# Patient Record
Sex: FEMALE | Race: WHITE | Employment: FULL TIME | URBAN - METROPOLITAN AREA
[De-identification: names, ages, dates, MRNs, and addresses within clinical notes are randomized per-mention and may not be internally consistent; named-entity substitution may affect disease eponyms.]

---

## 2017-08-01 ENCOUNTER — TRANSFERRED RECORDS (OUTPATIENT)
Dept: HEALTH INFORMATION MANAGEMENT | Facility: CLINIC | Age: 22
End: 2017-08-01

## 2017-08-30 ENCOUNTER — TRANSFERRED RECORDS (OUTPATIENT)
Dept: HEALTH INFORMATION MANAGEMENT | Facility: CLINIC | Age: 22
End: 2017-08-30

## 2018-09-06 ENCOUNTER — TRANSFERRED RECORDS (OUTPATIENT)
Dept: HEALTH INFORMATION MANAGEMENT | Facility: CLINIC | Age: 23
End: 2018-09-06

## 2018-10-08 NOTE — TELEPHONE ENCOUNTER
FUTURE VISIT INFORMATION      FUTURE VISIT INFORMATION:    Date: 10/17/18    Time: 1245    Location: ORTHO  REFERRAL INFORMATION:    Referring provider:  DR GONZALES    Referring providers clinic:  [facility]    Reason for visit/diagnosis  L FOOT OSTEOMA     RECORDS REQUESTED FROM:       Clinic name Comments Records Status Imaging Status   [facility] REQUESTED RECORDS AND IMAGES 10/11/18@1135                                     RECORDS STATUS      RECORDS RECEIVED FROM: [facility]   DATE RECEIVED: 10/11/18   NOTES STATUS DETAILS   OFFICE NOTE from referring provider Received 8/27/18*8/30/17*7/17/17*   OFFICE NOTE from other specialist N/A    DISCHARGE SUMMARY from hospital N/A    DISCHARGE REPORT from the ER N/A    OPERATIVE REPORT N/A    MEDICATION LIST N/A    IMPLANT RECORD/STICKER N/A    LABS     CBC/DIFF N/A    CULTURES N/A    INJECTIONS DONE IN RADIOLOGY N/A    MRI RECEIVED 8/1/17*   CT SCAN RECEIVED 9/6/18*   XRAYS (IMAGES & REPORTS) N/A    TUMOR     PATHOLOGY  Slides & report N/A

## 2018-10-17 ENCOUNTER — PRE VISIT (OUTPATIENT)
Dept: ORTHOPEDICS | Facility: CLINIC | Age: 23
End: 2018-10-17

## 2018-10-17 ENCOUNTER — OFFICE VISIT (OUTPATIENT)
Dept: ORTHOPEDICS | Facility: CLINIC | Age: 23
End: 2018-10-17
Payer: COMMERCIAL

## 2018-10-17 VITALS — HEIGHT: 65 IN | BODY MASS INDEX: 25.99 KG/M2 | WEIGHT: 156 LBS

## 2018-10-17 DIAGNOSIS — M89.9 BONE LESION: Primary | ICD-10-CM

## 2018-10-17 RX ORDER — HYDROXYZINE HYDROCHLORIDE 25 MG/1
TABLET, FILM COATED ORAL
Refills: 1 | COMMUNITY
Start: 2018-09-18

## 2018-10-17 RX ORDER — IBUPROFEN 800 MG/1
TABLET, FILM COATED ORAL
Refills: 0 | COMMUNITY
Start: 2018-08-27

## 2018-10-17 ASSESSMENT — ENCOUNTER SYMPTOMS
PANIC: 0
TASTE DISTURBANCE: 0
SMELL DISTURBANCE: 0
SINUS CONGESTION: 1
DEPRESSION: 1
DECREASED CONCENTRATION: 0
INSOMNIA: 0
SINUS PAIN: 0
HOARSE VOICE: 0
NECK MASS: 0
SORE THROAT: 1
NERVOUS/ANXIOUS: 1
TROUBLE SWALLOWING: 0

## 2018-10-17 NOTE — MR AVS SNAPSHOT
"              After Visit Summary   10/17/2018    Trudy Croft    MRN: [id number]           Patient Information     Date Of Birth          1995        Visit Information        Provider Department      10/17/2018 12:45 PM Gregorio Merrill MD [facility]        Today's Diagnoses     Bone lesion    -  1       Follow-ups after your visit        Who to contact     Please call your clinic at [phone] to:    Ask questions about your health    Make or cancel appointments    Discuss your medicines    Learn about your test results    Speak to your doctor            Additional Information About Your Visit        MyChart Information     [employer] is an electronic gateway that provides easy, online access to your medical records. With [employer], you can request a clinic appointment, read your test results, renew a prescription or communicate with your care team.     To sign up for VT[employer] visit the website at [email or web address]   You will be asked to enter the access code listed below, as well as some personal information. Please follow the directions to create your username and password.     Your access code is: [email or web address]  Expires: 2018  6:31 AM     Your access code will  in 90 days. If you need help or a new code, please contact your [facility] or call [phone] for assistance.        Care EveryWhere ID     This is your Care EveryWhere ID. This could be used by other organizations to access your [place] medical records  [id number]        Your Vitals Were     Height BMI (Body Mass Index)                1.651 m (5' 5\") 25.96 kg/m2           Blood Pressure from Last 3 Encounters:   No data found for BP    Weight from Last 3 Encounters:   10/17/18 70.8 kg (156 lb)              We Performed the Following     Arlin-Operative Worksheet        Primary Care Provider    Provider Not In System                Equal Access to Services     KERRI MCCANN " AH: Raeann armijorehancoy Gopal, waheenada luqadaha, qaybta kajake stoner, jorgito adelinein hayaarea maciasdomenica joseph minnie vaughn. So [facility] [phone].    ATENCIÓN: Si habla español, tiene a kline disposición servicios gratuitos de asistencia lingüística. Llame al [phone].    We comply with applicable federal civil rights laws and [place] laws. We do not discriminate on the basis of race, color, national origin, age, disability, sex, sexual orientation, or gender identity.            Thank you!     Thank you for choosing [facility]  for your care. Our goal is always to provide you with excellent care. Hearing back from our patients is one way we can continue to improve our services. Please take a few minutes to complete the written survey that you may receive in the mail after your visit with us. Thank you!             Your Updated Medication List - Protect others around you: Learn how to safely use, store and throw away your medicines at www.disposemymeds.org.          This list is accurate as of 10/17/18  1:26 PM.  Always use your most recent med list.                   Brand Name Dispense Instructions for use Diagnosis    FLUoxetine 20 MG capsule    PROzac     TK 2 CS PO D PRN        hydrOXYzine 25 MG tablet    ATARAX     TK 1 T PO Q 6 H PRA        ibuprofen 800 MG tablet    ADVIL/MOTRIN     TAKE 1 TABLET BY MOUTH 3 TIMES A DAY AS NEEDED

## 2018-10-17 NOTE — NURSING NOTE
Teaching Flowsheet   Relevant Diagnosis: ablation of left third metatarsal tumor  Teaching Topic: preop     Person(s) involved in teaching:   Patient     Motivation Level:  Asks Questions: Yes  Eager to Learn: Yes  Cooperative: Yes  Receptive (willing/able to accept information): Yes       Patient demonstrates understanding of the following:  Reason for the appointment, diagnosis and treatment plan: Yes  Knowledge of proper use of medications and conditions for which they are ordered (with special attention to potential side effects or drug interactions): Yes  Which situations necessitate calling provider and whom to contact: Yes       Teaching Concerns Addressed:        Proper use and care of soap (medical equip, care aids, etc.): Yes  Nutritional needs and diet plan: Yes  Pain management techniques: Yes  Wound Care: Yes  How and/when to access community resources: NA     Instructional Materials Used/Given: surgery packet reviewed with patient.  She will obtain H&P.  She has no other questions at this time.

## 2018-10-17 NOTE — LETTER
10/17/2018       RE: Trudy Croft  [address] [place] [address]     Dear Colleague,    Thank you for referring your patient, Trudy Croft, to [facility]. Please see a copy of my visit note below.    [facility]  Orthopedics  Gregorio Merrill MD  10/17/2018     Name: Trudy Croft  MRN: [id number]  Age: 23 year old  : 1995  Referring provider: Jovany Loya     Chief Complaint: left foot pain     History of Present Illness:   Trudy Croft is a 23 year old female who presents today for evaluation of left foot pain.  Patient said left foot pain on and off for about 10 years.  She states is been progressively worsening though very slowly.  Her pain is worse with activity and prevents her from doing a lot of running, however she does have pain at rest.  She notes pain at night which is primarily at the lateral side of her foot and ankle.  She takes ibuprofen which seems to help her sleep.  She denies any numbness or tingling.  She denies any palpable masses in the foot.    Review of Systems:   A 14-point review of systems was obtained and is negative except for as noted in the HPI.     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  She does not smoke tobacco.  She drinks 5 alcoholic beverages a week.    Family History:  No family history of bone or soft tissue tumors   Negative for bleeding or clotting disorders or adverse reactions to anesthesia.    Physical Examination:  Height 1.651  "m (5' 5\"), weight 70.8 kg (156 lb).   Body mass index is 25.96 kg/(m^2).   GEN: well appearing, no distress  RESP: Non labored breathing  SKIN: dry, non-diaphoretic   LYMPHATIC:  no edema   NEURO:  alert and oriented    VASCULAR: Satisfactory perfusion of all extremities  MUSCULOSKELETAL: Focused examination left lower extremity reveals no obvious masses about the foot.  She has no palpable masses.  She has tenderness with palpation about the midfoot.  His full foot and ankle range of motion.  Neurovascular intact.    Imaging:   CT scan of the left foot was obtained previously which shows 2 small sclerotic areas one in the base of the third metatarsal along the base of the first metatarsal.  Both of these are adjacent to the tarsometatarsal joints.    MRI scan was previous obtain a left foot reviewed here in clinic today.  This shows small lesions in the above areas dark on T2 and T1.    I have independently reviewed the above imaging studies; the results were discussed with the patient.     Assessment:   23 year old female with a sclerotic lesion in her first and third metatarsals differential includes osteoma or osteoblastoma.    Plan:   We reviewed the patient's diagnosis and imaging with her.  We discussed her treatment options which include radial frequency ablation or curettage and excision.  Given the location adjacent to the TMT joints recommended radio frequency ablation under navigation guidance.  We discussed using the ablation a little bit of centered, centered a bit more lateral and distal, to limit the exposure to the cartilage surface.  Risk benefits alternatives were reviewed and all questions were answered.    Jj Sullivan MD   Orthopedic Surgery; PGY-4    This patient was seen, examined and counseled by Dr. Merrill.      I was present with the resident during the history and exam.  I discussed the case with the resident and agree with the findings as documented in the assessment and " plan.  Gregorio Merrill MD

## 2018-10-17 NOTE — PROGRESS NOTES
"[facility]  Orthopedics  Gregorio Merrill MD  10/17/2018     Name: Trudy Croft  MRN: [id number]  Age: 23 year old  : 1995  Referring provider: Jovany Loya     Chief Complaint: left foot pain     History of Present Illness:   Trudy Croft is a 23 year old female who presents today for evaluation of left foot pain.  Patient said left foot pain on and off for about 10 years.  She states is been progressively worsening though very slowly.  Her pain is worse with activity and prevents her from doing a lot of running, however she does have pain at rest.  She notes pain at night which is primarily at the lateral side of her foot and ankle.  She takes ibuprofen which seems to help her sleep.  She denies any numbness or tingling.  She denies any palpable masses in the foot.    Review of Systems:   A 14-point review of systems was obtained and is negative except for as noted in the HPI.     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  She does not smoke tobacco.  She drinks 5 alcoholic beverages a week.    Family History:  No family history of bone or soft tissue tumors   Negative for bleeding or clotting disorders or adverse reactions to anesthesia.    Physical Examination:  Height 1.651 m (5' 5\"), weight 70.8 kg (156 lb).   Body mass index is 25.96 kg/(m^2).   GEN: well appearing, no distress  RESP: Non labored breathing  SKIN: dry, non-diaphoretic   LYMPHATIC:  no edema   NEURO:  alert and oriented    VASCULAR: Satisfactory perfusion of all " extremities  MUSCULOSKELETAL: Focused examination left lower extremity reveals no obvious masses about the foot.  She has no palpable masses.  She has tenderness with palpation about the midfoot.  His full foot and ankle range of motion.  Neurovascular intact.    Imaging:   CT scan of the left foot was obtained previously which shows 2 small sclerotic areas one in the base of the third metatarsal along the base of the first metatarsal.  Both of these are adjacent to the tarsometatarsal joints.    MRI scan was previous obtain a left foot reviewed here in clinic today.  This shows small lesions in the above areas dark on T2 and T1.    I have independently reviewed the above imaging studies; the results were discussed with the patient.     Assessment:   23 year old female with a sclerotic lesion in her first and third metatarsals differential includes osteoma or osteoblastoma.    Plan:   We reviewed the patient's diagnosis and imaging with her.  We discussed her treatment options which include radial frequency ablation or curettage and excision.  Given the location adjacent to the TMT joints recommended radio frequency ablation under navigation guidance.  We discussed using the ablation a little bit of centered, centered a bit more lateral and distal, to limit the exposure to the cartilage surface.  Risk benefits alternatives were reviewed and all questions were answered.    Jj Sullivan MD   Orthopedic Surgery; PGY-4    This patient was seen, examined and counseled by Dr. Merrill.      Answers for HPI/ROS submitted by the patient on 10/17/2018   General Symptoms: No  Skin Symptoms: No  HENT Symptoms: Yes  EYE SYMPTOMS: No  HEART SYMPTOMS: No  LUNG SYMPTOMS: No  INTESTINAL SYMPTOMS: No  URINARY SYMPTOMS: No  GYNECOLOGIC SYMPTOMS: No  BREAST SYMPTOMS: No  SKELETAL SYMPTOMS: No  BLOOD SYMPTOMS: No  NERVOUS SYSTEM SYMPTOMS: No  MENTAL HEALTH SYMPTOMS: Yes  Ear pain: No  Ear discharge: No  Hearing loss: No  Tinnitus:  No  Nosebleeds: No  Congestion: Yes  Sinus pain: No  Trouble swallowing: No   Voice hoarseness: No  Mouth sores: No  Sore throat: Yes  Tooth pain: No  Gum tenderness: No  Bleeding gums: No  Change in taste: No  Change in sense of smell: No  Dry mouth: No  Hearing aid used: No  Neck lump: No  Nervous or Anxious: Yes  Depression: Yes  Trouble sleeping: No  Trouble thinking or concentrating: No  Mood changes: No  Panic attacks: No  PHQ-2 Score: 1

## 2018-10-17 NOTE — NURSING NOTE
"Reason For Visit:   Chief Complaint   Patient presents with     Left Foot - Consult       Ht 1.651 m (5' 5\")  Wt 70.8 kg (156 lb)  BMI 25.96 kg/m2    Pain Assessment  Patient Currently in Pain: Yes  0-10 Pain Scale: 5  Primary Pain Location: Foot  Pain Orientation: Left  Pain Descriptors: Aching, Dull, Throbbing  Aggravating Factors: Walking    Tony Gann ATC  "

## 2018-10-18 ENCOUNTER — DOCUMENTATION ONLY (OUTPATIENT)
Dept: ORTHOPEDICS | Facility: CLINIC | Age: 23
End: 2018-10-18

## 2018-10-18 NOTE — PROGRESS NOTES
Patient is scheduled for surgery with Dr. Merrill    Spoke or left message with: Rizwana Lai RN    Date of Surgery: 11/12/18    Location: [place]    Post op: 2 weeks    Pre-op with surgeon (if applicable): Complete    H&P: Patient to schedule with PCP    Additional imaging/appointments: N/A    Surgery packet: Received in clinic    Additional comments: N/A

## 2018-11-08 RX ORDER — FLUOXETINE 40 MG/1
40 CAPSULE ORAL DAILY
COMMUNITY

## 2018-11-11 ENCOUNTER — ANESTHESIA EVENT (OUTPATIENT)
Dept: SURGERY | Facility: CLINIC | Age: 23
End: 2018-11-11
Payer: COMMERCIAL

## 2018-11-11 NOTE — ANESTHESIA PREPROCEDURE EVALUATION
Anesthesia Pre-Procedure Evaluation    Patient: Trudy Croft   MRN:     [id number] Gender:   female   Age:    23 year old :      1995        Preoperative Diagnosis: Bone Lesion   Procedure(s):  Ablation Of Left Third Metatarsal Tumor     Past Medical History:   Diagnosis Date     Depression       Past Surgical History:   Procedure Laterality Date     EXTRACTION(S) DENTAL      [place] teeth          Anesthesia Evaluation     . Pt has had prior anesthetic.     No history of anesthetic complications          ROS/MED HX    ENT/Pulmonary:  - neg pulmonary ROS     Neurologic:  - neg neurologic ROS     Cardiovascular:  - neg cardiovascular ROS       METS/Exercise Tolerance:  >4 METS   Hematologic:  - neg hematologic  ROS       Musculoskeletal:  - neg musculoskeletal ROS       GI/Hepatic:  - neg GI/hepatic ROS       Renal/Genitourinary:  - ROS Renal section negative       Endo:  - neg endo ROS       Psychiatric:     (+) psychiatric history anxiety and depression      Infectious Disease:  - neg infectious disease ROS       Malignancy:      - no malignancy   Other:    (+) No chance of pregnancy C-spine cleared: N/A, H/O Chronic Pain,                       PHYSICAL EXAM:   Mental Status/Neuro: A/A/O   Airway: Facies: Feasible  Mallampati: I  Mouth/Opening: Full  TM distance: > 6 cm  Neck ROM: Full   Respiratory: Auscultation: CTAB     Resp. Rate: Normal     Resp. Effort: Normal      CV: Rhythm: Regular  Rate: Age appropriate  Heart: Normal Sounds   Comments:      Dental: Normal                    No current outpatient prescriptions on file.       Lab Results   Component Value Date    HCG Negative 2018       Preop Vitals  BP Readings from Last 3 Encounters:   18 117/79    Pulse Readings from Last 3 Encounters:   No data found for Pulse      Resp Readings from Last 3 Encounters:   18 20    SpO2 Readings from Last 3 Encounters:   18 100%      Temp Readings from Last 1 Encounters:  "  11/12/18 36.4  C (97.6  F) (Oral)    Ht Readings from Last 1 Encounters:   11/12/18 1.651 m (5' 5\")      Wt Readings from Last 1 Encounters:   11/12/18 70.1 kg (154 lb 8.7 oz)    Estimated body mass index is 25.72 kg/(m^2) as calculated from the following:    Height as of this encounter: 1.651 m (5' 5\").    Weight as of this encounter: 70.1 kg (154 lb 8.7 oz).     LDA:            Assessment:   ASA SCORE: 1    NPO Status: > 2 hours since completed Clear Liquids; > 6 hours since completed Solid Foods   Documentation: H&P complete; Preop Testing complete; Consents complete   Proceeding: Proceed without further delay  Tobacco Use:  NO Active use of Tobacco/UNKNOWN Tobacco use status     Plan:   Anes. Type:  General   Pre-Induction: Midazolam IV; Acetaminophen PO   Induction:  IV (Standard)   Airway: LMA   Access/Monitoring: PIV   Maintenance: Balanced   Emergence: Procedure Site   Logistics: Same Day Surgery     Postop Pain/Sedation Strategy:  Standard-Options: Opioids PRN; LA by Surgeon     PONV Management:  Adult Risk Factors: Female, Non-Smoker, Postop Opioids  Prevention: Ondansetron; Dexamethasone     CONSENT: Direct conversation   Plan and risks discussed with: Patient   Blood Products: Consent Deferred (Minimal Blood Loss)     Comments for Plan/Consent:  Discussed common and potentially harmful risks for General Anesthesia.   These risks include, but were not limited to: Conversion to secured airway, Sore throat, Airway injury, Dental injury, Aspiration, Respiratory issues (Bronchospasm, Laryngospasm, Desaturation), Hemodynamic issues (Arrhythmia, Hypotension, Ischemia), Potential long term consequences of respiratory and hemodynamic issues, PONV, Emergence delirium  Risks of invasive procedures were not discussed: N/A    All questions were answered.                     Dada Bucio MD  "

## 2018-11-12 ENCOUNTER — SURGERY (OUTPATIENT)
Age: 23
End: 2018-11-12

## 2018-11-12 ENCOUNTER — HOSPITAL ENCOUNTER (OUTPATIENT)
Facility: CLINIC | Age: 23
Discharge: HOME | End: 2018-11-12
Attending: ORTHOPAEDIC SURGERY | Admitting: ORTHOPAEDIC SURGERY
Payer: COMMERCIAL

## 2018-11-12 ENCOUNTER — APPOINTMENT (OUTPATIENT)
Dept: GENERAL RADIOLOGY | Facility: CLINIC | Age: 23
End: 2018-11-12
Attending: ORTHOPAEDIC SURGERY
Payer: COMMERCIAL

## 2018-11-12 ENCOUNTER — ANESTHESIA (OUTPATIENT)
Dept: SURGERY | Facility: CLINIC | Age: 23
End: 2018-11-12
Payer: COMMERCIAL

## 2018-11-12 VITALS
SYSTOLIC BLOOD PRESSURE: 119 MMHG | DIASTOLIC BLOOD PRESSURE: 69 MMHG | BODY MASS INDEX: 25.75 KG/M2 | WEIGHT: 154.54 LBS | HEIGHT: 65 IN | TEMPERATURE: 97.6 F | RESPIRATION RATE: 23 BRPM | OXYGEN SATURATION: 99 %

## 2018-11-12 DIAGNOSIS — D16.9 OSTEOID OSTEOMA: Primary | ICD-10-CM

## 2018-11-12 LAB
GLUCOSE BLDC GLUCOMTR-MCNC: 91 MG/DL (ref 70–99)
HCG UR QL: NEGATIVE

## 2018-11-12 PROCEDURE — 36000074 ZZH SURGERY LEVEL 6 1ST 30 MIN - UMMC: Performed by: ORTHOPAEDIC SURGERY

## 2018-11-12 PROCEDURE — 81025 URINE PREGNANCY TEST: CPT | Performed by: ANESTHESIOLOGY

## 2018-11-12 PROCEDURE — 40000278 XR SURGERY CARM FLUORO LESS THAN 5 MIN: Mod: TC

## 2018-11-12 PROCEDURE — 36000076 ZZH SURGERY LEVEL 6 EA 15 ADDTL MIN - UMMC: Performed by: ORTHOPAEDIC SURGERY

## 2018-11-12 PROCEDURE — 25000566 ZZH SEVOFLURANE, EA 15 MIN: Performed by: ORTHOPAEDIC SURGERY

## 2018-11-12 PROCEDURE — C1888 ENDOVAS NON-CARDIAC ABL CATH: HCPCS | Performed by: ORTHOPAEDIC SURGERY

## 2018-11-12 PROCEDURE — 37000009 ZZH ANESTHESIA TECHNICAL FEE, EACH ADDTL 15 MIN: Performed by: ORTHOPAEDIC SURGERY

## 2018-11-12 PROCEDURE — 71000014 ZZH RECOVERY PHASE 1 LEVEL 2 FIRST HR: Performed by: ORTHOPAEDIC SURGERY

## 2018-11-12 PROCEDURE — 25000128 H RX IP 250 OP 636: Performed by: ORTHOPAEDIC SURGERY

## 2018-11-12 PROCEDURE — 82962 GLUCOSE BLOOD TEST: CPT

## 2018-11-12 PROCEDURE — 71000027 ZZH RECOVERY PHASE 2 EACH 15 MINS: Performed by: ORTHOPAEDIC SURGERY

## 2018-11-12 PROCEDURE — 40000170 ZZH STATISTIC PRE-PROCEDURE ASSESSMENT II: Performed by: ORTHOPAEDIC SURGERY

## 2018-11-12 PROCEDURE — 37000008 ZZH ANESTHESIA TECHNICAL FEE, 1ST 30 MIN: Performed by: ORTHOPAEDIC SURGERY

## 2018-11-12 PROCEDURE — 25000125 ZZHC RX 250: Performed by: NURSE ANESTHETIST, CERTIFIED REGISTERED

## 2018-11-12 PROCEDURE — 25000132 ZZH RX MED GY IP 250 OP 250 PS 637: Performed by: ANESTHESIOLOGY

## 2018-11-12 PROCEDURE — 25000128 H RX IP 250 OP 636: Performed by: NURSE ANESTHETIST, CERTIFIED REGISTERED

## 2018-11-12 PROCEDURE — 27210794 ZZH OR GENERAL SUPPLY STERILE: Performed by: ORTHOPAEDIC SURGERY

## 2018-11-12 PROCEDURE — 25000128 H RX IP 250 OP 636: Performed by: ANESTHESIOLOGY

## 2018-11-12 RX ORDER — HYDROCODONE BITARTRATE AND ACETAMINOPHEN 5; 325 MG/1; MG/1
1 TABLET ORAL
Status: DISCONTINUED | OUTPATIENT
Start: 2018-11-12 | End: 2018-11-12 | Stop reason: HOSPADM

## 2018-11-12 RX ORDER — KETOROLAC TROMETHAMINE 30 MG/ML
INJECTION, SOLUTION INTRAMUSCULAR; INTRAVENOUS PRN
Status: DISCONTINUED | OUTPATIENT
Start: 2018-11-12 | End: 2018-11-12

## 2018-11-12 RX ORDER — ONDANSETRON 4 MG/1
4 TABLET, ORALLY DISINTEGRATING ORAL
Status: DISCONTINUED | OUTPATIENT
Start: 2018-11-12 | End: 2018-11-12 | Stop reason: HOSPADM

## 2018-11-12 RX ORDER — BUPIVACAINE HYDROCHLORIDE 2.5 MG/ML
INJECTION, SOLUTION INFILTRATION; PERINEURAL PRN
Status: DISCONTINUED | OUTPATIENT
Start: 2018-11-12 | End: 2018-11-12 | Stop reason: HOSPADM

## 2018-11-12 RX ORDER — PROPOFOL 10 MG/ML
INJECTION, EMULSION INTRAVENOUS PRN
Status: DISCONTINUED | OUTPATIENT
Start: 2018-11-12 | End: 2018-11-12

## 2018-11-12 RX ORDER — ACETAMINOPHEN 500 MG
1000 TABLET ORAL ONCE
Status: COMPLETED | OUTPATIENT
Start: 2018-11-12 | End: 2018-11-12

## 2018-11-12 RX ORDER — CEFAZOLIN SODIUM 2 G/100ML
2 INJECTION, SOLUTION INTRAVENOUS
Status: COMPLETED | OUTPATIENT
Start: 2018-11-12 | End: 2018-11-12

## 2018-11-12 RX ORDER — LIDOCAINE HYDROCHLORIDE 20 MG/ML
INJECTION, SOLUTION INFILTRATION; PERINEURAL PRN
Status: DISCONTINUED | OUTPATIENT
Start: 2018-11-12 | End: 2018-11-12

## 2018-11-12 RX ORDER — SODIUM CHLORIDE, SODIUM LACTATE, POTASSIUM CHLORIDE, CALCIUM CHLORIDE 600; 310; 30; 20 MG/100ML; MG/100ML; MG/100ML; MG/100ML
INJECTION, SOLUTION INTRAVENOUS CONTINUOUS
Status: DISCONTINUED | OUTPATIENT
Start: 2018-11-12 | End: 2018-11-12 | Stop reason: HOSPADM

## 2018-11-12 RX ORDER — LIDOCAINE 40 MG/G
CREAM TOPICAL
Status: DISCONTINUED | OUTPATIENT
Start: 2018-11-12 | End: 2018-11-12 | Stop reason: HOSPADM

## 2018-11-12 RX ORDER — FENTANYL CITRATE 50 UG/ML
INJECTION, SOLUTION INTRAMUSCULAR; INTRAVENOUS PRN
Status: DISCONTINUED | OUTPATIENT
Start: 2018-11-12 | End: 2018-11-12

## 2018-11-12 RX ORDER — HYDROMORPHONE HYDROCHLORIDE 1 MG/ML
0.4 INJECTION, SOLUTION INTRAMUSCULAR; INTRAVENOUS; SUBCUTANEOUS EVERY 10 MIN PRN
Status: DISCONTINUED | OUTPATIENT
Start: 2018-11-12 | End: 2018-11-12 | Stop reason: HOSPADM

## 2018-11-12 RX ORDER — ACETAMINOPHEN 325 MG/1
650 TABLET ORAL
Status: DISCONTINUED | OUTPATIENT
Start: 2018-11-12 | End: 2018-11-12 | Stop reason: HOSPADM

## 2018-11-12 RX ORDER — EPHEDRINE SULFATE 50 MG/ML
INJECTION, SOLUTION INTRAMUSCULAR; INTRAVENOUS; SUBCUTANEOUS PRN
Status: DISCONTINUED | OUTPATIENT
Start: 2018-11-12 | End: 2018-11-12

## 2018-11-12 RX ORDER — CEFAZOLIN SODIUM 1 G/3ML
1 INJECTION, POWDER, FOR SOLUTION INTRAMUSCULAR; INTRAVENOUS SEE ADMIN INSTRUCTIONS
Status: DISCONTINUED | OUTPATIENT
Start: 2018-11-12 | End: 2018-11-12 | Stop reason: HOSPADM

## 2018-11-12 RX ORDER — ALBUTEROL SULFATE 0.83 MG/ML
2.5 SOLUTION RESPIRATORY (INHALATION)
Status: DISCONTINUED | OUTPATIENT
Start: 2018-11-12 | End: 2018-11-12 | Stop reason: HOSPADM

## 2018-11-12 RX ORDER — ONDANSETRON 2 MG/ML
INJECTION INTRAMUSCULAR; INTRAVENOUS PRN
Status: DISCONTINUED | OUTPATIENT
Start: 2018-11-12 | End: 2018-11-12

## 2018-11-12 RX ORDER — HYDROCODONE BITARTRATE AND ACETAMINOPHEN 5; 325 MG/1; MG/1
1-2 TABLET ORAL EVERY 4 HOURS PRN
Qty: 15 TABLET | Refills: 0 | Status: SHIPPED | OUTPATIENT
Start: 2018-11-12

## 2018-11-12 RX ORDER — DEXAMETHASONE SODIUM PHOSPHATE 4 MG/ML
INJECTION, SOLUTION INTRA-ARTICULAR; INTRALESIONAL; INTRAMUSCULAR; INTRAVENOUS; SOFT TISSUE PRN
Status: DISCONTINUED | OUTPATIENT
Start: 2018-11-12 | End: 2018-11-12

## 2018-11-12 RX ADMIN — Medication 5 MG: at 12:57

## 2018-11-12 RX ADMIN — BUPIVACAINE HYDROCHLORIDE 10 ML: 2.5 INJECTION, SOLUTION INFILTRATION; PERINEURAL at 14:07

## 2018-11-12 RX ADMIN — HYDROMORPHONE HYDROCHLORIDE 0.5 MG: 1 INJECTION, SOLUTION INTRAMUSCULAR; INTRAVENOUS; SUBCUTANEOUS at 13:34

## 2018-11-12 RX ADMIN — ACETAMINOPHEN 1000 MG: 500 TABLET ORAL at 09:54

## 2018-11-12 RX ADMIN — FENTANYL CITRATE 100 MCG: 50 INJECTION, SOLUTION INTRAMUSCULAR; INTRAVENOUS at 12:37

## 2018-11-12 RX ADMIN — PROPOFOL 200 MG: 10 INJECTION, EMULSION INTRAVENOUS at 12:39

## 2018-11-12 RX ADMIN — DEXAMETHASONE SODIUM PHOSPHATE 8 MG: 4 INJECTION, SOLUTION INTRAMUSCULAR; INTRAVENOUS at 12:45

## 2018-11-12 RX ADMIN — LIDOCAINE HYDROCHLORIDE 60 MG: 20 INJECTION, SOLUTION INFILTRATION; PERINEURAL at 12:38

## 2018-11-12 RX ADMIN — CEFAZOLIN SODIUM 2 G: 2 INJECTION, SOLUTION INTRAVENOUS at 12:45

## 2018-11-12 RX ADMIN — Medication 5 MG: at 12:54

## 2018-11-12 RX ADMIN — HYDROMORPHONE HYDROCHLORIDE 0.5 MG: 1 INJECTION, SOLUTION INTRAMUSCULAR; INTRAVENOUS; SUBCUTANEOUS at 13:23

## 2018-11-12 RX ADMIN — SODIUM CHLORIDE, POTASSIUM CHLORIDE, SODIUM LACTATE AND CALCIUM CHLORIDE: 600; 310; 30; 20 INJECTION, SOLUTION INTRAVENOUS at 12:20

## 2018-11-12 RX ADMIN — ONDANSETRON 4 MG: 2 INJECTION INTRAMUSCULAR; INTRAVENOUS at 12:45

## 2018-11-12 RX ADMIN — MIDAZOLAM 2 MG: 1 INJECTION INTRAMUSCULAR; INTRAVENOUS at 12:26

## 2018-11-12 RX ADMIN — KETOROLAC TROMETHAMINE 30 MG: 30 INJECTION, SOLUTION INTRAMUSCULAR at 13:23

## 2018-11-12 NOTE — ANESTHESIA CARE TRANSFER NOTE
Patient: Trudy Croft    Procedure(s):  Ablation Of Left Third Metatarsal Tumor    Diagnosis: Bone Lesion  Diagnosis Additional Information: No value filed.    Anesthesia Type:   General     Note:  Airway :Face Mask  Patient transferred to:PACU  Handoff Report: Identifed the Patient, Identified the Reponsible Provider, Reviewed the pertinent medical history, Discussed the surgical course, Reviewed Intra-OP anesthesia mangement and issues during anesthesia, Set expectations for post-procedure period and Allowed opportunity for questions and acknowledgement of understanding      Vitals: (Last set prior to Anesthesia Care Transfer)    CRNA VITALS  11/12/2018 1349 - 11/12/2018 1438      11/12/2018             Resp Rate (observed):                 Electronically Signed By: VICKY Jim CRNA  November 12, 2018  2:38 PM

## 2018-11-12 NOTE — BRIEF OP NOTE
[facility] Brief Operative Note    Pre-operative diagnosis: Bone Lesion   Post-operative diagnosis Bone lesion, left 3rd metatarsal   Procedure: Procedure(s):  Ablation Of Left Third Metatarsal Tumor   Surgeon(s): Surgeon(s) and Role:     * Gregorio Merrill MD - Primary     * Catina Durant PA-C - Assisting   Estimated blood loss: 2 mL    Specimens: * No specimens in log *   Findings: None    Post-op Plan:  WB status:   FWB, no running, jumping or twisting activities for 6 weeks  Device:  CAM boot when up walking x 2 weeks, then wean out  DVT Prophylaxis:  Not needed   Follow-up:  2 weeks with Dr. Merrill/RAJENDRA for wound check

## 2018-11-12 NOTE — DISCHARGE INSTRUCTIONS
[facility]   Adult Discharge Orders & Instructions     For 24 hours after surgery    1. Get plenty of rest.  A responsible adult must stay with you for at least 24 hours after you leave the hospital.   2. Do not drive or use heavy equipment.  If you have weakness or tingling, don't drive or use heavy equipment until this feeling goes away.  3. Do not drink alcohol.  4. Avoid strenuous or risky activities.  Ask for help when climbing stairs.   5. You may feel lightheaded.  IF so, sit for a few minutes before standing.  Have someone help you get up.   6. If you have nausea (feel sick to your stomach): Drink only clear liquids such as apple juice, ginger ale, broth or 7-Up.  Rest may also help.  Be sure to drink enough fluids.  Move to a regular diet as you feel able.  7. You may have a slight fever. Call the doctor if your fever is over 100 F (37.7 C) (taken under the tongue) or lasts longer than 24 hours.  8. You may have a dry mouth, a sore throat, muscle aches or trouble sleeping.  These should go away after 24 hours.  9. Do not make important or legal decisions.   Call your doctor for any of the followin.  Signs of infection (fever, growing tenderness at the surgery site, a large amount of drainage or bleeding, severe pain, foul-smelling drainage, redness, swelling).    2. It has been over 8 to 10 hours since surgery and you are still not able to urinate (pass water).    3.  Headache for over 24 hours.    4.  Numbness, tingling or weakness the day after surgery (if you had spinal anesthesia).  To contact a doctor, call ________________________________________

## 2018-11-12 NOTE — IP AVS SNAPSHOT
[address] [address]    Phone:  [phone]                                       After Visit Summary   11/12/2018    Trudy Croft    MRN: [id number]           After Visit Summary Signature Page     I have received my discharge instructions, and my questions have been answered. I have discussed any challenges I see with this plan with the nurse or doctor.    ..........................................................................................................................................  Patient/Patient Representative Signature      ..........................................................................................................................................  Patient Representative Print Name and Relationship to Patient    ..................................................               ................................................  Date                                   Time    ..........................................................................................................................................  Reviewed by Signature/Title    ...................................................              ..............................................  Date                                               Time          22EPIC Rev 08/18

## 2018-11-12 NOTE — IP AVS SNAPSHOT
MRN:[id number]                      After Visit Summary   11/12/2018    Trudy Croft    MRN: [id number]           Thank you!     Thank you for choosing [facility] for your care. Our goal is always to provide you with excellent care. Hearing back from our patients is one way we can continue to improve our services. Please take a few minutes to complete the written survey that you may receive in the mail after you visit with us. Thank you!        Patient Information     Date Of Birth          1995        About your hospital stay     You were admitted on:  November 12, 2018 [facility]    You were discharged on:  November 12, 2018       Who to Call     For medical emergencies, please call 911.  For non-urgent questions about your medical care, please call your primary care provider or clinic, [phone]  For questions related to your surgery, please call your surgery clinic        Attending Provider     Provider Specialty    Gregorio Merrill MD Orthopaedic Surgery       Primary Care Provider Office Phone # Fax #    [facility] [phone]      After Care Instructions      Diet as Tolerated       Return to diet before surgery, unless instructed otherwise.            Discharge Instructions       Review outpatient procedure discharge instructions with patient as directed by Provider            Ice to affected area       Ice pack to surgical site every 15 minutes per hour for 24 hours            No driving or operating machinery        until the day after procedure            Notify Provider       CALL YOUR PHYSICIAN IF:  1.  Your pain begins to worsen and is unable to be controlled with your medications.  2.  Excessive redness or drainage of cloudy or bloody material from the wounds (Clear red tinted fluid and some mild drainage should be expected). Drainage of any kind 5 days after surgery should be reported to the  doctor.  3.  You have a temperature elevation greater than 101.5    4.  You have pain, swelling or redness in your calf. You have numbness or weakness in your leg or foot.    You many call your physician at [phone] during business hours.    For after hours or on weekends, you may call the hospital at [phone] and ask for the orthopedic resident on call.            Remove dressing - at 72 hours       OK to shower and get incision wet in 4-5 days.  No soaking in a tub or pool for 2 weeks.            Return to clinic       Return to clinic in 2 weeks            Shower        Cover dressing if dressing is not going to be changed today            Weight bearing - As tolerated       Wear CAM boot when walking, ok to remove boot at rest and when sleeping. No running, jumping or twisting activities for 6 weeks                  Further instructions from your care team       [facility]   Adult Discharge Orders & Instructions     For 24 hours after surgery    1. Get plenty of rest.  A responsible adult must stay with you for at least 24 hours after you leave the hospital.   2. Do not drive or use heavy equipment.  If you have weakness or tingling, don't drive or use heavy equipment until this feeling goes away.  3. Do not drink alcohol.  4. Avoid strenuous or risky activities.  Ask for help when climbing stairs.   5. You may feel lightheaded.  IF so, sit for a few minutes before standing.  Have someone help you get up.   6. If you have nausea (feel sick to your stomach): Drink only clear liquids such as apple juice, ginger ale, broth or 7-Up.  Rest may also help.  Be sure to drink enough fluids.  Move to a regular diet as you feel able.  7. You may have a slight fever. Call the doctor if your fever is over 100 F (37.7 C) (taken under the tongue) or lasts longer than 24 hours.  8. You may have a dry mouth, a sore throat, muscle aches or trouble sleeping.  These should go away after 24 hours.  9. Do not  "make important or legal decisions.   Call your doctor for any of the followin.  Signs of infection (fever, growing tenderness at the surgery site, a large amount of drainage or bleeding, severe pain, foul-smelling drainage, redness, swelling).    2. It has been over 8 to 10 hours since surgery and you are still not able to urinate (pass water).    3.  Headache for over 24 hours.    4.  Numbness, tingling or weakness the day after surgery (if you had spinal anesthesia).  To contact a doctor, call ________________________________________    Pending Results     No orders found from 11/10/2018 to 2018.            Admission Information     Date & Time Provider Department Dept. Phone    2018 Gregorio Merrill MD [facility] PACU [phone]      Your Vitals Were     Blood Pressure Temperature Respirations Height Weight Pulse Oximetry    126/74 97.6  F (36.4  C) (Axillary) 12 1.651 m (5' 5\") 70.1 kg (154 lb 8.7 oz) 97%    BMI (Body Mass Index)                   25.72 kg/m2           MyChart Information     [employer] lets you send messages to your doctor, view your test results, renew your prescriptions, schedule appointments and more. To sign up, go to [email or web address] . Click on \"Log in\" on the left side of the screen, which will take you to the Welcome page. Then click on \"Sign up Now\" on the right side of the page.     You will be asked to enter the access code listed below, as well as some personal information. Please follow the directions to create your username and password.     Your access code is: [email or web address]  Expires: 2018  5:31 AM     Your access code will  in 90 days. If you need help or a new code, please call your [place] clinic or [phone].        Care EveryWhere ID     This is your Care EveryWhere ID. This could be used by other organizations to access your [facility] medical records  [id number]        Equal Access to Services     KERRI MCCANN : Georgianaii florian rogel " vasquez Herron, waheenada luqadaha, qaybta kaalmada herbie, jorgito leerea anny [facility] [phone].    ATENCIÓN: Si thala luda, tiene a kline disposición servicios gratuitos de asistencia lingüística. Henok al [phone].    We comply with applicable federal civil rights laws and [place] laws. We do not discriminate on the basis of race, color, national origin, age, disability, sex, sexual orientation, or gender identity.               Review of your medicines      START taking        Dose / Directions    HYDROcodone-acetaminophen 5-325 MG per tablet   Commonly known as:  NORCO   Used for:  Osteoid osteoma        Dose:  1-2 tablet   Take 1-2 tablets by mouth every 4 hours as needed for moderate to severe pain   Quantity:  15 tablet   Refills:  0         CONTINUE these medicines which have NOT CHANGED        Dose / Directions    hydrOXYzine 25 MG tablet   Commonly known as:  ATARAX        TK 1 T PO Q 6 H PRA   Refills:  1       ibuprofen 800 MG tablet   Commonly known as:  ADVIL/MOTRIN        TAKE 1 TABLET BY MOUTH 3 TIMES A DAY AS NEEDED   Refills:  0       PROZAC 40 MG capsule   Generic drug:  FLUoxetine        Dose:  40 mg   Take 40 mg by mouth daily   Refills:  0            Where to get your medicines      Some of these will need a paper prescription and others can be bought over the counter. Ask your nurse if you have questions.     Bring a paper prescription for each of these medications     HYDROcodone-acetaminophen 5-325 MG per tablet                Protect others around you: Learn how to safely use, store and throw away your medicines at www.disposemymeds.org.        Information about OPIOIDS     PRESCRIPTION OPIOIDS: WHAT YOU NEED TO KNOW   We gave you an opioid (narcotic) pain medicine. It is important to manage your pain, but opioids are not always the best choice. You should first try all the other options your care team gave you. Take this medicine for as short a time (and  as few doses) as possible.    Some activities can increase your pain, such as bandage changes or therapy sessions. It may help to take your pain medicine 30 to 60 minutes before these activities. Reduce your stress by getting enough sleep, working on hobbies you enjoy and practicing relaxation or meditation. Talk to your care team about ways to manage your pain beyond prescription opioids.    These medicines have risks:    DO NOT drive when on new or higher doses of pain medicine. These medicines can affect your alertness and reaction times, and you could be arrested for driving under the influence (DUI). If you need to use opioids long-term, talk to your care team about driving.    DO NOT operate heavy machinery    DO NOT do any other dangerous activities while taking these medicines.    DO NOT drink any alcohol while taking these medicines.     If the opioid prescribed includes acetaminophen, DO NOT take with any other medicines that contain acetaminophen. Read all labels carefully. Look for the word  acetaminophen  or  Tylenol.  Ask your pharmacist if you have questions or are unsure.    You can get addicted to pain medicines, especially if you have a history of addiction (chemical, alcohol or substance dependence). Talk to your care team about ways to reduce this risk.    All opioids tend to cause constipation. Drink plenty of water and eat foods that have a lot of fiber, such as fruits, vegetables, prune juice, apple juice and high-fiber cereal. Take a laxative (Miralax, milk of magnesia, Colace, Senna) if you don t move your bowels at least every other day. Other side effects include upset stomach, sleepiness, dizziness, throwing up, tolerance (needing more of the medicine to have the same effect), physical dependence and slowed breathing.    Store your pills in a secure place, locked if possible. We will not replace any lost or stolen medicine. If you don t finish your medicine, please throw away (dispose) as  directed by your pharmacist. The [place] Pollution Control Agency has more information about safe disposal: https://www.pca.state.[place]/living-green/managing-unwanted-medications             Medication List: This is a list of all your medications and when to take them. Check marks below indicate your daily home schedule. Keep this list as a reference.      Medications           Morning Afternoon Evening Bedtime As Needed    HYDROcodone-acetaminophen 5-325 MG per tablet   Commonly known as:  NORCO   Take 1-2 tablets by mouth every 4 hours as needed for moderate to severe pain                                hydrOXYzine 25 MG tablet   Commonly known as:  ATARAX   TK 1 T PO Q 6 H PRA                                ibuprofen 800 MG tablet   Commonly known as:  ADVIL/MOTRIN   TAKE 1 TABLET BY MOUTH 3 TIMES A DAY AS NEEDED                                PROZAC 40 MG capsule   Take 40 mg by mouth daily   Generic drug:  FLUoxetine

## 2018-11-13 NOTE — ANESTHESIA POSTPROCEDURE EVALUATION
Anesthesia POST Procedure Evaluation    Patient: Trudy Croft   MRN:     [id number] Gender:   female   Age:    23 year old :      1995        Preoperative Diagnosis: Bone Lesion   Procedure(s):  Ablation Of Left Third Metatarsal Tumor   Postop Comments: No value filed.       Anesthesia Type:  General    Reportable Event: NO     PAIN: Uncomplicated   Sign Out status: Comfortable, Well controlled pain     PONV: No PONV   Sign Out status:  No Nausea or Vomiting     Neuro/Psych: Uneventful perioperative course   Sign Out Status: Preoperative baseline; Age appropriate mentation     Airway/Resp.: Uneventful perioperative course   Sign Out Status: Non labored breathing, age appropriate RR; Resp. Status within EXPECTED Parameters     CV: Uneventful perioperative course   Sign Out status: Appropriate BP and perfusion indices; Appropriate HR/Rhythm     Disposition:   Sign Out in:  PACU  Disposition:  Phase II; Home  Recovery Course: Uneventful  Follow-Up: Not required     Comments/Narrative:  Ready for discharge           Last Anesthesia Record Vitals:  CRNA VITALS  2018 1349 - 2018 1449      2018             NIBP: 130/76    Pulse: 76    NIBP Mean: 95    Temp: 37.2  C (99  F)    SpO2: 98 %    Resp Rate (observed): 12    EKG: Sinus rhythm          Last PACU/Preop Vitals:  Vitals:    18 1500 18 1515 18 1530   BP: 126/74 133/72 119/69   Resp: 23   Temp: 36.4  C (97.6  F)     SpO2: 97% 98% 99%         Electronically Signed By: Dada Bucio MD, 2018, 10:21 PM

## 2018-11-15 NOTE — OP NOTE
DATE OF SURGERY: 11/15/2018    PREOPERATIVE DIAGNOSIS: Left third metatarsal bone lesion     POSTOPERATIVE DIAGNOSIS: Left third metatarsal bone lesion    PROCEDURE: Radiofrequency ablation of left third metatarsal bone lesion using O arm and navigation guidance    SURGEON: Gregorio Merrill MD     ASSISTANT: Catina Durant PA-C as an assistant was required to help hold instruments and close the wound, and resident help was not available.    PATIENT HISTORY: This patient has a focally painful lesion in her midfoot.  Imaging reveals a lucent area with some dense sclerosis around it.  The patient has some edema in this area as well.  He thought this could be an osteoid osteoma given the patient's relief with NSAIDs and duration of this consistently painful spot.  She understands that she may not get pain relief and that there is a risk of fracture bleeding infection pain numbness tingling and stiffness.    DESCRIPTION OF PROCEDURE: The patient was placed supine and underwent successful induction of general anesthesia.  The left leg and foot were washed and sterilely prepped and draped.  He is x-ray guidance to place a pin into the third and fourth metatarsals.  This was used to hold our reference frame.  An O arm spin was done through the midfoot and then that information imported to the navigation system.  I used the navigation to find a spot over the lesion.  An incision was made and the soft tissues were spread down to bone.  I used a navigated drill guide and made a hole in the bone right along the side of this lesion.  When the reasons I went just to the side was because #1 it was so sclerotic and #2 it was close to the joint.  I placed a radiofrequency probe into the bone and we took some x-ray views to confirm that this was in a reasonable spot.  We were happy with this position and then did a treatment of 90  C for 6 minutes.  Once the Procrit was cooled it was removed.  The pins were removed without  incident.  We copiously irrigated and closed the incisions with Vicryl the subcutaneous tissue Monocryl the skin Steri-Strips and sterile dry dressings.  The patient had a cam boot placed on her after sterile dry dressings were applied.  She was extubated and taken to the recovery room in stable condition.  There were no complications.  The blood loss was 2 mL.  There are no specimens.  I was present for all critical portions of the procedure.    Gregorio Merrill MD

## 2018-12-12 ENCOUNTER — OFFICE VISIT (OUTPATIENT)
Dept: ORTHOPEDICS | Facility: CLINIC | Age: 23
End: 2018-12-12
Payer: COMMERCIAL

## 2018-12-12 VITALS — BODY MASS INDEX: 25.66 KG/M2 | HEIGHT: 65 IN | WEIGHT: 154 LBS

## 2018-12-12 DIAGNOSIS — M89.9 BONE LESION: Primary | ICD-10-CM

## 2018-12-12 ASSESSMENT — ENCOUNTER SYMPTOMS
POSTURAL DYSPNEA: 0
DYSPNEA ON EXERTION: 0
HEMOPTYSIS: 0
SHORTNESS OF BREATH: 0
WHEEZING: 0
COUGH DISTURBING SLEEP: 1
COUGH: 1
SPUTUM PRODUCTION: 1
SNORES LOUDLY: 0

## 2018-12-12 ASSESSMENT — MIFFLIN-ST. JEOR: SCORE: 1449.42

## 2018-12-12 NOTE — NURSING NOTE
"Reason For Visit:   Chief Complaint   Patient presents with     Left Foot - Surgical Followup       Ht 1.651 m (5' 5\")   Wt 69.9 kg (154 lb)   BMI 25.63 kg/m      Pain Assessment  Patient Currently in Pain: Yes  0-10 Pain Scale: 2  Primary Pain Location: Foot  Pain Orientation: Left  Pain Descriptors: Kavita Gann ATC  "

## 2018-12-12 NOTE — LETTER
12/12/2018       RE: Trudy Croft  [address] [place] [id number]     Dear Colleague,    Thank you for referring your patient, Trudy Croft, to [facility]. Please see a copy of my visit note below.    This 23-year-old woman is very happy with her procedure.  Her 6 years of pain in her foot have resolved.  She notes that on most immediately after waking up from surgery.  Her incisions are clean dry and intact.  I will let her wean out of her boot and begin walking.  She should wait 6 weeks before doing impact exercises on that left foot.  She will return to see me as needed.    Again, thank you for allowing me to participate in the care of your patient.      Sincerely,    Gregorio Merrill MD

## 2018-12-12 NOTE — PROGRESS NOTES
This 23-year-old woman is very happy with her procedure.  Her 6 years of pain in her foot have resolved.  She notes that on most immediately after waking up from surgery.  Her incisions are clean dry and intact.  I will let her wean out of her boot and begin walking.  She should wait 6 weeks before doing impact exercises on that left foot.  She will return to see me as needed.

## 2023-03-14 ENCOUNTER — OFFICE VISIT (OUTPATIENT)
Dept: PLASTIC SURGERY | Facility: CLINIC | Age: 28
End: 2023-03-14

## 2023-03-14 DIAGNOSIS — Z41.1 ENCOUNTER FOR COSMETIC PROCEDURE: Primary | ICD-10-CM

## 2023-03-14 NOTE — PROGRESS NOTES
Facial Plastic and Reconstructive Surgery Cosmetic Consultation    Referring Provider:   Moira Maria MD, MD  [facility] DERMATOLOGY  [address] [place] [id number]    HPI:   I had the pleasure of seeing Trudy Croft today in clinic for consultation for a facial lesion excision.    Trudy Croft is a 27 year old female. She has a nevus above her left brow that has been there for as long as she can remember. Slowly growing over the years. It has been evaluated by a dermatologist and she was told it was non-concerning and did not need to be biopsied. No itching, bleeding, or significant changes. She had a similar lesion on her lateral forehead that was biopsied and this pathology returned as benign. She is getting  at the end of September and would like the lesion removed prior to that.       PE:  Alert and Oriented, Answering Questions Appropriately  Atraumatic, Normocephalic, Face Symmetric  Skin: Jeffries 1  Facial Nerve Intact and facial movement symmetric  There is a 8mm flesh colored circular nevus 3mm superior to the most superior aspect of her left medial brow. Non tender to palpation. There is a second, much smaller lesion overlying the tail of the left brow (site of previous shave).          IMPRESSION/PLAN: Trudy Croft is a 27 year old female with an 8mm benign appearing lesion superior to the left brow. She is getting  in September and wants it removed. We discussed options including shave versus excision. After a thorough discussion, she is opting for a shave. Risks and benefits were discussed including but not limited to scarring, recurrence, bleeding, infection, need for additional procedures.     Photodocumentation was obtained.

## 2023-04-18 ENCOUNTER — OFFICE VISIT (OUTPATIENT)
Dept: PLASTIC SURGERY | Facility: CLINIC | Age: 28
End: 2023-04-18

## 2023-04-18 DIAGNOSIS — Z41.1 ENCOUNTER FOR COSMETIC PROCEDURE: Primary | ICD-10-CM

## 2023-04-18 NOTE — Clinical Note
April 18, 2023  Re: Trudy Boykinlubna  1995    Dear Dr. Maria,    Thank you so much for referring Trudy Evanszabepaulina Croft to the [facility]. I had the pleasure of visiting with Trudy today.     Attached you will find a copy of my note. Please feel free to reach out to me with any questions, [phone].     No notes on file      Your trust in our practice and care is much appreciated.    Sincerely,  Frances Hernandez MD

## 2023-04-18 NOTE — PROGRESS NOTES
Procedure Note  04/18/23     Preoperative diagnosis: Left forehead lesion  Postoperative diagnosis: Same    Procedure performed: Shave excision of left forehead lesion    Anesthesia: Local  Specimens: None  Complications: None    Indications: Trudy Croft is a 27 year old female with a 6mm x 5mm papular lesions consistent with a benign nevus of the left forehead, just superior to the medial brow. This was evaluated by dermatology with no concerns. After thorough discussion of risks, benefits, and alternatives she opted to go forward.     Procedure: Consent was obtained. Patient was positioned. The lesion was marked and infiltrated with 2% lidocaine with 1:100,000 epinephrine. The patient was then prepped in a clean fashion. A 15 blade was used to shave the lesion flush with surrounding tissue. Hemostasis was achieved with pressure. Ointment and a dressing was placed.     We will see her back in 1 month, sooner if there are issues.     Frances Hernandez MD

## 2023-04-18 NOTE — Clinical Note
4/18/2023       RE: Trudy Croft  [address] [facility] [address]     Dear Colleague,    Thank you for referring your patient, Trudy Croft, to the [facility] at [facility]. Please see a copy of my visit note below.    No notes on file    Again, thank you for allowing me to participate in the care of your patient.      Sincerely,    Francse Hernandez MD

## 2023-04-22 NOTE — NURSING NOTE
Consent obtained for shave excision of left forehead lesion.    Lilly Marin RN  4/22/2023 5:02 PM

## 2023-06-20 ENCOUNTER — OFFICE VISIT (OUTPATIENT)
Dept: PLASTIC SURGERY | Facility: CLINIC | Age: 28
End: 2023-06-20

## 2023-06-20 DIAGNOSIS — Z41.1 ENCOUNTER FOR COSMETIC PROCEDURE: Primary | ICD-10-CM

## 2023-06-20 NOTE — Clinical Note
June 20, 2023  Re: Trudy Croft  1995    Dear Dr. Maria,    Thank you so much for referring Trudy Croft to the [facility]. I had the pleasure of visiting with Trudy today.     Attached you will find a copy of my note. Please feel free to reach out to me with any questions, [phone].     Facial Plastic and Reconstructive Surgery      Trudy Croft is a 28 year old female now about 2 months s/p a forehead mole shave on 4/18/23. She is getting  in September. Today, she reports ***    On exam, the site is nicely healed. ***    Trudy is doing well and pleased with the result. No further follow up needed. She should use silicone scar gel. She will let us know if she needs us for anything else in the future and we would be happy to take care of her.     Frances Hernandez MD         Your trust in our practice and care is much appreciated.    Sincerely,  Frances Hernandez MD

## 2023-06-20 NOTE — Clinical Note
6/20/2023       RE: Trudy Croft  [address] [address] [facility] [address]     Dear Colleague,    Thank you for referring your patient, Trudy Croft, to the [facility] at [facility]. Please see a copy of my visit note below.    Facial Plastic and Reconstructive Surgery      Trudy Croft is a 28 year old female now about 2 months s/p a forehead mole shave on 4/18/23. She is getting  in September. Today, she reports ***    On exam, the site is nicely healed. ***    Trudy is doing well and pleased with the result. No further follow up needed. She should use silicone scar gel. She will let us know if she needs us for anything else in the future and we would be happy to take care of her.     Frances Hernandez MD       Again, thank you for allowing me to participate in the care of your patient.      Sincerely,    Frances Hernandez MD

## 2023-06-20 NOTE — PROGRESS NOTES
Facial Plastic and Reconstructive Surgery      Trudy Croft is a 28 year old female now about 2 months s/p a forehead mole shave on 4/18/23. She is getting  in September. Today, she reports she is doing well. She still has a raised area and now a small area of hyperpigmentation in the center. She reports healing very quickly after the last shave.    On exam, the site is nicely healed. 1mm area of hyperpigmentation in the center of the lesion which is still slightly raised. We shaved this again today with good result.     Procedure:  Decision was made with the family and the patient for shave excision.  This was performed today after consent was obtained.    Shave bx in typical fashion. Area cleaned with betadyne and anesthetized with 1% lidocaine with 1:100,000 epinephrine. Then a #15 blade used to remove the lesion.  Since this was determined to have no concerning features is in agreement with previous evaluations by other physicians decision was made not to send the lesion for pathologic evaluation.    Patient tolerated the procedure well.  Wound care was performed and instructions were given.    I would like to see her back in 3 weeks, sooner if there are issues.     Frances Hernandez MD

## 2023-07-11 ENCOUNTER — OFFICE VISIT (OUTPATIENT)
Dept: PLASTIC SURGERY | Facility: CLINIC | Age: 28
End: 2023-07-11

## 2023-07-11 DIAGNOSIS — Z41.1 ENCOUNTER FOR COSMETIC PROCEDURE: Primary | ICD-10-CM

## 2023-07-11 NOTE — Clinical Note
July 11, 2023  Re: Trudy Croft  1995    Dear Dr. Maria,    Thank you so much for referring Trudy Croft to the [facility]. I had the pleasure of visiting with Trudy today.     Attached you will find a copy of my note. Please feel free to reach out to me with any questions, [phone].     Facial Plastic and Reconstructive Surgery        Trudy Croft is a 28 year old female s/p a forehead mole shave on 4/18/23 and re-shave on 6/20/23 for a recurrence. She is getting  in September. Today, she reports she is doing well. Happy with how it has turned out.     On exam, there is still some erythema from the most recent shave, but it is coming along nicely. Very slightly raised laterally.      A/P: Trudy looks great. She will continue to heal. I told her to reach out if she needs anything at all before her wedding in September.        Frances Hernandez MD         Your trust in our practice and care is much appreciated.    Sincerely,  Frances Hernandez MD
7/11/2023       RE: Trudy Croft  [id number] [facility] [address]     Dear Colleague,    Thank you for referring your patient, Trudy Croft, to the [facility] at [facility]. Please see a copy of my visit note below.    Facial Plastic and Reconstructive Surgery        Trudy Croft is a 28 year old female s/p a forehead mole shave on 4/18/23 and re-shave on 6/20/23 for a recurrence. She is getting  in September. Today, she reports she is doing well. Happy with how it has turned out.     On exam, there is still some erythema from the most recent shave, but it is coming along nicely. Very slightly raised laterally.      A/P: Trudy looks great. She will continue to heal. I told her to reach out if she needs anything at all before her wedding in September.        Frances Hernandez MD       Again, thank you for allowing me to participate in the care of your patient.      Sincerely,    Frances Hernandez MD    
no fever and no chills.

## 2023-07-11 NOTE — PROGRESS NOTES
Facial Plastic and Reconstructive Surgery        Trudy Croft is a 28 year old female s/p a forehead mole shave on 4/18/23 and re-shave on 6/20/23 for a recurrence. She is getting  in September. Today, she reports she is doing well. Happy with how it has turned out.     On exam, there is still some erythema from the most recent shave, but it is coming along nicely. Very slightly raised laterally.      A/P: Trudy looks great. She will continue to heal. I told her to reach out if she needs anything at all before her wedding in September.        Frances Hernandez MD

## 2023-11-29 NOTE — TELEPHONE ENCOUNTER
DIAGNOSIS: Follow-Up - Left Foot ugegabby 2018   APPOINTMENT DATE: 12/01/2023   NOTES STATUS DETAILS   OFFICE NOTE from referring provider SELF    OFFICE NOTE from other specialist Internal 12/12/2018 - Gregorio Merrill MD - [facility]   OPERATIVE REPORT Internal 11/12/2018 - Ablation of Left Third Metatarsal Tumor   MEDICATION LIST Care Everywhere    XRAYS PACS Internal:  11/12/2018 - LT Foot   MRI PACS [facility]:  08/01/2017 - LT Foot   CT SCAN PACS [facility]:  09/06/2018 - LT Foot

## 2023-11-30 DIAGNOSIS — M89.9 BONE LESION: Primary | ICD-10-CM

## 2023-12-01 ENCOUNTER — ANCILLARY PROCEDURE (OUTPATIENT)
Dept: GENERAL RADIOLOGY | Facility: CLINIC | Age: 28
End: 2023-12-01
Attending: ORTHOPAEDIC SURGERY
Payer: COMMERCIAL

## 2023-12-01 ENCOUNTER — PRE VISIT (OUTPATIENT)
Dept: ORTHOPEDICS | Facility: CLINIC | Age: 28
End: 2023-12-01

## 2023-12-01 ENCOUNTER — OFFICE VISIT (OUTPATIENT)
Dept: ORTHOPEDICS | Facility: CLINIC | Age: 28
End: 2023-12-01
Payer: COMMERCIAL

## 2023-12-01 ENCOUNTER — ANCILLARY PROCEDURE (OUTPATIENT)
Dept: CT IMAGING | Facility: CLINIC | Age: 28
End: 2023-12-01
Attending: ORTHOPAEDIC SURGERY
Payer: COMMERCIAL

## 2023-12-01 VITALS — HEIGHT: 65 IN | BODY MASS INDEX: 26.99 KG/M2 | WEIGHT: 162 LBS

## 2023-12-01 DIAGNOSIS — M89.9 BONE LESION: ICD-10-CM

## 2023-12-01 DIAGNOSIS — M89.9 BONE LESION: Primary | ICD-10-CM

## 2023-12-01 PROCEDURE — 73700 CT LOWER EXTREMITY W/O DYE: CPT | Mod: LT | Performed by: RADIOLOGY

## 2023-12-01 PROCEDURE — 73630 X-RAY EXAM OF FOOT: CPT | Mod: LT | Performed by: RADIOLOGY

## 2023-12-01 PROCEDURE — 99203 OFFICE O/P NEW LOW 30 MIN: CPT | Performed by: ORTHOPAEDIC SURGERY

## 2023-12-01 RX ORDER — MONTELUKAST SODIUM 10 MG/1
10 TABLET ORAL DAILY
COMMUNITY
Start: 2023-06-28

## 2023-12-01 NOTE — LETTER
12/1/2023         RE: Trudy Croft  [id number] [facility] [address]        Dear Colleague,    Thank you for referring your patient, Trudy Croft, to the [facility] [place]. Please see a copy of my visit note below.    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  She stated today that she had excellent relief from that.  Recently she has noticed increased pain in that same bone although less intense than her original presentation.  The pain is more prominent at nighttime.  She presents to have this investigated.    On examination she is alert oriented has a normal mood and affect and is in no acute distress.  In the left foot I do not appreciate any swelling or deformity of the foot.  Her ankle range of motion is within normal limits.    Reviewed her x-rays and there is some periosteal bone formation in the distal third metatarsal diaphysis.  I reviewed her CT scan from several years ago and the most concerning lesion was more proximal.    I would like to do a CT scan with fine cuts through the metatarsals to see if there is something that we could target again with ablation.  If not then we would consider doing a curettage and grafting.        Gregorio Merrill MD

## 2023-12-01 NOTE — NURSING NOTE
"Reason For Visit:   Chief Complaint   Patient presents with    RECHECK     -Follow up left foot lesion last seen 2018  -discuss pain when walking and during sleep       Ht 1.65 m (5' 4.96\")   Wt 73.5 kg (162 lb)   BMI 26.99 kg/m      Pain Assessment  Patient Currently in Pain: Yes  0-10 Pain Scale: 3  Primary Pain Location: Foot (left)      Miles CARLOS Hicks    "

## 2023-12-01 NOTE — PROGRESS NOTES
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  She stated today that she had excellent relief from that.  Recently she has noticed increased pain in that same bone although less intense than her original presentation.  The pain is more prominent at nighttime.  She presents to have this investigated.    On examination she is alert oriented has a normal mood and affect and is in no acute distress.  In the left foot I do not appreciate any swelling or deformity of the foot.  Her ankle range of motion is within normal limits.    Reviewed her x-rays and there is some periosteal bone formation in the distal third metatarsal diaphysis.  I reviewed her CT scan from several years ago and the most concerning lesion was more proximal.    I would like to do a CT scan with fine cuts through the metatarsals to see if there is something that we could target again with ablation.  If not then we would consider doing a curettage and grafting.

## 2023-12-06 ENCOUNTER — VIRTUAL VISIT (OUTPATIENT)
Dept: ORTHOPEDICS | Facility: CLINIC | Age: 28
End: 2023-12-06
Payer: COMMERCIAL

## 2023-12-06 ENCOUNTER — TELEPHONE (OUTPATIENT)
Dept: ORTHOPEDICS | Facility: CLINIC | Age: 28
End: 2023-12-06

## 2023-12-06 DIAGNOSIS — M89.9 BONE LESION: Primary | ICD-10-CM

## 2023-12-06 PROCEDURE — 99441 PR PHYSICIAN TELEPHONE EVALUATION 5-10 MIN: CPT | Performed by: ORTHOPAEDIC SURGERY

## 2023-12-06 NOTE — LETTER
12/6/2023         RE: Trudy Croft  [id number] [facility] [address]        Dear Colleague,    Thank you for referring your patient, Trudy Croft, to the [facility] [place]. Please see a copy of my visit note below.    This is a 28-year-old skin concerned that her osteoid osteoma may be returning.  She was treated for it 5 years ago.  She has similar but milder pain in the similar area of her foot and presents today to discuss her recent CT scan.    Reviewed the CT scan and the sclerotic portion of this previous lesion is no longer present.  There are some small lytic areas but there is not significant bone formation in this base of the third metatarsal where the lesion was previously.  The patient may have some subtle degenerative changes that could be driving the symptoms as well.    Over the phone the patient was alert oriented and appropriate.    This patient also shared that she is interested and having a child sometime in the next year and a half or so and wants to make sure that there are no issues that would make pregnancy more difficult.  I think we should get an MRI with and without contrast of this area and see if there are any sign of bone focused inflammation which would be expected for an osteoid osteoma.  We will then again talk about the MRI findings.  The patient is in agreement with this plan.    I talked with this patient by phone for less than 5 minutes.  I spent more than 5 minutes reviewing her current CT scan previous CT scans and past records.        Gregorio Mrerill MD

## 2023-12-08 ENCOUNTER — TELEPHONE (OUTPATIENT)
Dept: ORTHOPEDICS | Facility: CLINIC | Age: 28
End: 2023-12-08
Payer: COMMERCIAL

## 2023-12-14 ENCOUNTER — TELEPHONE (OUTPATIENT)
Dept: ORTHOPEDICS | Facility: CLINIC | Age: 28
End: 2023-12-14
Payer: COMMERCIAL

## 2023-12-14 NOTE — TELEPHONE ENCOUNTER
- A call was placed to the patient. Patient did not answer phone so a voicemail was left.     - Call back number to clinic was given and patient was told to call back and ask for Dr. Desirae Nj's nurse.

## 2023-12-15 ENCOUNTER — TELEPHONE (OUTPATIENT)
Dept: ORTHOPEDICS | Facility: CLINIC | Age: 28
End: 2023-12-15
Payer: COMMERCIAL

## 2023-12-15 NOTE — TELEPHONE ENCOUNTER
- A call was placed to the patient.     - Make follow up appointment with Desirae    - Patient verbalized understanding of plan and all questions were answered. Call back number to clinic was given and patient was told to call if they had an further questions.

## 2024-04-02 NOTE — PROGRESS NOTES
Patient arrived by [facility].  Per [facility], was called to home for pt unresponsive.   Patient is arousable to voice.   Patient on NRB 15L oxygen  Patient sitting up in bed.  Respirations labored.   #20 g IV by EMS left foot.   Patient denies pain   Patient A&O x3     This is a 28-year-old skin concerned that her osteoid osteoma may be returning.  She was treated for it 5 years ago.  She has similar but milder pain in the similar area of her foot and presents today to discuss her recent CT scan.    Reviewed the CT scan and the sclerotic portion of this previous lesion is no longer present.  There are some small lytic areas but there is not significant bone formation in this base of the third metatarsal where the lesion was previously.  The patient may have some subtle degenerative changes that could be driving the symptoms as well.    Over the phone the patient was alert oriented and appropriate.    This patient also shared that she is interested and having a child sometime in the next year and a half or so and wants to make sure that there are no issues that would make pregnancy more difficult.  I think we should get an MRI with and without contrast of this area and see if there are any sign of bone focused inflammation which would be expected for an osteoid osteoma.  We will then again talk about the MRI findings.  The patient is in agreement with this plan.    I talked with this patient by phone for less than 5 minutes.  I spent more than 5 minutes reviewing her current CT scan previous CT scans and past records.

## (undated) DEVICE — GLOVE PROTEXIS W/NEU-THERA 7.0  2D73TE70

## (undated) DEVICE — DRAPE O ARM TUBE 9732722

## (undated) DEVICE — PROBE RF ABLATION COOL TIP 15CM-1CM ACT1510

## (undated) DEVICE — Device

## (undated) DEVICE — PREP DURAPREP 26ML APL 8630

## (undated) DEVICE — MARKER SPHERZ PACK 5

## (undated) DEVICE — PACK LOWER EXTREMITY RIVERSIDE SOP32LEFSX

## (undated) DEVICE — PREP POVIDONE IODINE SCRUB 7.5% 120ML

## (undated) DEVICE — STRAP KNEE/BODY 31143004

## (undated) DEVICE — TUBING SUCTION MEDI-VAC 1/4"X20' N620A

## (undated) DEVICE — DRSG PRIMAPORE 02X3" 7133

## (undated) DEVICE — SUCTION TIP YANKAUER STR K87

## (undated) DEVICE — LINEN GOWN X4 5410

## (undated) DEVICE — SU MONOCRYL 4-0 PS-2 18" UND Y496G

## (undated) DEVICE — ESU PENCIL W/SMOKE EVAC NEPTUNE STRYKER 0703-046-000

## (undated) DEVICE — DRAPE IOBAN INCISE 23X17" 6650EZ

## (undated) DEVICE — SOL WATER IRRIG 1000ML BOTTLE 2F7114

## (undated) DEVICE — PREP SKIN SCRUB TRAY 4461A

## (undated) DEVICE — SU VICRYL 2-0 CP-2 27" UND J869H

## (undated) DEVICE — SYR 10ML FINGER CONTROL W/O NDL 309695

## (undated) DEVICE — DRSG STERI STRIP 1/2X4" R1547

## (undated) DEVICE — ESU GROUND PAD UNIVERSAL W/O CORD

## (undated) DEVICE — SUCTION MANIFOLD DORNOCH ULTRA CART UL-CL500

## (undated) DEVICE — SYR BULB IRRIG 50ML LATEX FREE 0035280

## (undated) DEVICE — DRAPE STERI TOWEL LG 1010

## (undated) DEVICE — PREP DURAPREP REMOVER 4OZ 8611

## (undated) DEVICE — LINEN ORTHO PACK 5446

## (undated) RX ORDER — BUPIVACAINE HYDROCHLORIDE 2.5 MG/ML
INJECTION, SOLUTION EPIDURAL; INFILTRATION; INTRACAUDAL
Status: DISPENSED
Start: 2018-11-12

## (undated) RX ORDER — HYDROMORPHONE HYDROCHLORIDE 1 MG/ML
INJECTION, SOLUTION INTRAMUSCULAR; INTRAVENOUS; SUBCUTANEOUS
Status: DISPENSED
Start: 2018-11-12

## (undated) RX ORDER — ACETAMINOPHEN 500 MG
TABLET ORAL
Status: DISPENSED
Start: 2018-11-12

## (undated) RX ORDER — FENTANYL CITRATE 50 UG/ML
INJECTION, SOLUTION INTRAMUSCULAR; INTRAVENOUS
Status: DISPENSED
Start: 2018-11-12

## (undated) RX ORDER — CEFAZOLIN SODIUM 2 G/100ML
INJECTION, SOLUTION INTRAVENOUS
Status: DISPENSED
Start: 2018-11-12

## (undated) RX ORDER — PROPOFOL 10 MG/ML
INJECTION, EMULSION INTRAVENOUS
Status: DISPENSED
Start: 2018-11-12

## (undated) RX ORDER — ONDANSETRON 2 MG/ML
INJECTION INTRAMUSCULAR; INTRAVENOUS
Status: DISPENSED
Start: 2018-11-12